# Patient Record
Sex: FEMALE | Race: WHITE | NOT HISPANIC OR LATINO | URBAN - METROPOLITAN AREA
[De-identification: names, ages, dates, MRNs, and addresses within clinical notes are randomized per-mention and may not be internally consistent; named-entity substitution may affect disease eponyms.]

---

## 2021-05-04 ENCOUNTER — PREPPED CHART (OUTPATIENT)
Dept: URBAN - METROPOLITAN AREA CLINIC 53 | Facility: CLINIC | Age: 71
End: 2021-05-04

## 2021-05-04 VITALS — HEIGHT: 55 IN | SYSTOLIC BLOOD PRESSURE: 130 MMHG | DIASTOLIC BLOOD PRESSURE: 86 MMHG | HEART RATE: 57 BPM

## 2021-05-06 ENCOUNTER — CATARACT PRE-OP (OUTPATIENT)
Dept: URBAN - METROPOLITAN AREA CLINIC 53 | Facility: CLINIC | Age: 71
End: 2021-05-06

## 2021-05-06 VITALS — HEIGHT: 55 IN | HEART RATE: 57 BPM | DIASTOLIC BLOOD PRESSURE: 86 MMHG | SYSTOLIC BLOOD PRESSURE: 130 MMHG

## 2021-05-06 DIAGNOSIS — H25.811: ICD-10-CM

## 2021-05-06 PROCEDURE — PREOP PRE OP VISIT

## 2021-05-06 ASSESSMENT — TONOMETRY
OD_IOP_MMHG: 14
OS_IOP_MMHG: 14
OD_IOP_MMHG: 14
OS_IOP_MMHG: 14

## 2021-05-06 ASSESSMENT — VISUAL ACUITY
OS_SC: 20/40
OD_SC: 20/100
OD_SC: 20/100-
OS_SC: 20/40

## 2021-05-06 NOTE — PATIENT DISCUSSION
LENS OPTION (PREMIUM): Discussed with patient in detail all available methods and lens options as well as their associated benefits, limitations and out-of-pocket costs. Patient chooses femtosecond laser-assisted cataract surgery with presbyopia-correcting lens implant. The patient understands that with any IOL there is no guarantee that they will not require glasses to see their best at any distance after surgery. The risks, benefits and alternatives to surgery were explained and all questions were answered.

## 2021-05-06 NOTE — PATIENT DISCUSSION
CATARACT SURGERY PLANNER - VIVITY IOL/+FEMTO: Phacoemulsification with IOL: Eye: OD|DOS: 05/11/2021|Model: [id number]|Power: +21. 0|Target: [place]|Femto: YES|Arcs: 45 @ 0 ; 45 @ 180|Axis: NO|Visc: DUET|Omidria: YES|10% Phenylephrine: YES|Epi-shugarcaine: YES|Phaco Setting: DENSE|BSS+: NO|Trypan Blue: NO|CTR: NO|Olive Tip: NO|Atropine: NO|Pupilloplasty: NO|Notes: H/O LASIK PLAN ORA.

## 2021-05-11 ENCOUNTER — SAME DAY PO (OUTPATIENT)
Dept: URBAN - METROPOLITAN AREA CLINIC 49 | Facility: CLINIC | Age: 71
End: 2021-05-11

## 2021-05-11 ENCOUNTER — SURGERY/PROCEDURE (OUTPATIENT)
Dept: URBAN - METROPOLITAN AREA SURGERY 16 | Facility: SURGERY | Age: 71
End: 2021-05-11

## 2021-05-11 DIAGNOSIS — H25.12: ICD-10-CM

## 2021-05-11 DIAGNOSIS — Z96.1: ICD-10-CM

## 2021-05-11 DIAGNOSIS — Z98.41: ICD-10-CM

## 2021-05-11 DIAGNOSIS — H25.811: ICD-10-CM

## 2021-05-11 PROCEDURE — DAT015FEMT VIVITY EDOF IOL WITH FEMTO

## 2021-05-11 PROCEDURE — 99024 POSTOP FOLLOW-UP VISIT: CPT

## 2021-05-11 PROCEDURE — 66984 XCAPSL CTRC RMVL W/O ECP: CPT

## 2021-05-11 ASSESSMENT — TONOMETRY: OD_IOP_MMHG: 23

## 2021-05-11 ASSESSMENT — VISUAL ACUITY: OD_SC: 20/50+2

## 2021-05-11 NOTE — PATIENT DISCUSSION
CATARACT SURGERY PLANNER - VIVITY IOL/+FEMTO: Phacoemulsification with IOL: Eye: OD|DOS: 05/11/2021|Model: [id number]|Power: +21. 0|Target: [place]|Femto: YES|Arcs: 45 @ 0 ; 45 @ 180|Axis: NO|Visc: DUET|Omidria: YES|10% Phenylephrine: YES|Epi-shugarcaine: YES|Phaco Setting: DENSE|BSS+: NO|Trypan Blue: NO|CTR: NO|Olive Tip: NO|Atropine: NO|Pupilloplasty: NO|Notes: H/O LASIK PLAN ORA.

## 2021-05-18 ENCOUNTER — CATARACT PRE-OP (OUTPATIENT)
Dept: URBAN - METROPOLITAN AREA CLINIC 53 | Facility: CLINIC | Age: 71
End: 2021-05-18

## 2021-05-18 VITALS — SYSTOLIC BLOOD PRESSURE: 103 MMHG | HEART RATE: 57 BPM | DIASTOLIC BLOOD PRESSURE: 75 MMHG | HEIGHT: 55 IN

## 2021-05-18 DIAGNOSIS — Z96.1: ICD-10-CM

## 2021-05-18 DIAGNOSIS — H25.12: ICD-10-CM

## 2021-05-18 DIAGNOSIS — H25.812: ICD-10-CM

## 2021-05-18 PROCEDURE — 92136 - 2N OPHTHALMIC BIOMETRY BY PARTIAL COHERENCE INTERFEROMETRY WITH INTRAOCULAR LENS POWER CALCULATION

## 2021-05-18 PROCEDURE — PREOP PRE OP VISIT

## 2021-05-18 ASSESSMENT — TONOMETRY
OS_IOP_MMHG: 14
OD_IOP_MMHG: 14

## 2021-05-18 ASSESSMENT — VISUAL ACUITY
OS_SC: 20/40
OD_SC: 20/30

## 2021-05-18 NOTE — PATIENT DISCUSSION
CATARACT SURGERY PLANNER - MULTIFOCAL IOL/+FEMTO: Phacoemulsification with IOL: Eye: OS|DOS: 05/25/2021|Model: [id number]|Power: +21.00|Target: [place]|Femto: YES|Arcs: 45 @ 60 ; 45 @ 240|Axis: -|Visc: DUET|Omidria: YES|10% Phenylephrine: YES|Epi-shugarcaine: YES|Phaco Setting: STANDARD|BSS+: NO|Trypan Blue: NO|CTR: NO|Olive Tip: NO|Atropine: NO|Pupilloplasty: NO|Notes: PLAN ORA.

## 2021-05-25 ENCOUNTER — SURGERY/PROCEDURE (OUTPATIENT)
Dept: URBAN - METROPOLITAN AREA SURGERY 16 | Facility: SURGERY | Age: 71
End: 2021-05-25

## 2021-05-25 ENCOUNTER — SAME DAY PO (OUTPATIENT)
Dept: URBAN - METROPOLITAN AREA CLINIC 49 | Facility: CLINIC | Age: 71
End: 2021-05-25

## 2021-05-25 DIAGNOSIS — H25.812: ICD-10-CM

## 2021-05-25 DIAGNOSIS — Z96.1: ICD-10-CM

## 2021-05-25 DIAGNOSIS — Z98.42: ICD-10-CM

## 2021-05-25 PROCEDURE — 99024 POSTOP FOLLOW-UP VISIT: CPT

## 2021-05-25 PROCEDURE — DAT015FEMT VIVITY EDOF IOL WITH FEMTO

## 2021-05-25 PROCEDURE — 66984 XCAPSL CTRC RMVL W/O ECP: CPT

## 2021-05-25 ASSESSMENT — VISUAL ACUITY: OS_SC: 20/80

## 2021-05-25 ASSESSMENT — TONOMETRY: OS_IOP_MMHG: 04

## 2021-06-04 ENCOUNTER — POST-OP (OUTPATIENT)
Dept: URBAN - METROPOLITAN AREA CLINIC 53 | Facility: CLINIC | Age: 71
End: 2021-06-04

## 2021-06-04 DIAGNOSIS — H35.372: ICD-10-CM

## 2021-06-04 DIAGNOSIS — Z96.1: ICD-10-CM

## 2021-06-04 DIAGNOSIS — H25.812: ICD-10-CM

## 2021-06-04 DIAGNOSIS — H25.12: ICD-10-CM

## 2021-06-04 PROCEDURE — 99024 POSTOP FOLLOW-UP VISIT: CPT

## 2021-06-04 PROCEDURE — 92134 CPTRZ OPH DX IMG PST SGM RTA: CPT

## 2021-06-04 ASSESSMENT — TONOMETRY
OS_IOP_MMHG: 12
OD_IOP_MMHG: 12

## 2021-06-04 ASSESSMENT — VISUAL ACUITY
OU_SC: J1+
OU_SC: 20/40
OD_SC: 20/60
OU_SC: J3 @ 22"
OS_SC: 20/40+2
OS_PH: 20/30
OD_PH: 20/25-1

## 2021-06-24 ENCOUNTER — 4 WEEK POST-OP (OUTPATIENT)
Dept: URBAN - METROPOLITAN AREA CLINIC 53 | Facility: CLINIC | Age: 71
End: 2021-06-24

## 2021-06-24 DIAGNOSIS — H25.12: ICD-10-CM

## 2021-06-24 DIAGNOSIS — H35.373: ICD-10-CM

## 2021-06-24 DIAGNOSIS — H25.812: ICD-10-CM

## 2021-06-24 DIAGNOSIS — H52.4: ICD-10-CM

## 2021-06-24 DIAGNOSIS — H26.492: ICD-10-CM

## 2021-06-24 DIAGNOSIS — Z96.1: ICD-10-CM

## 2021-06-24 PROCEDURE — 92015 DETERMINE REFRACTIVE STATE: CPT

## 2021-06-24 PROCEDURE — 99024 POSTOP FOLLOW-UP VISIT: CPT

## 2021-06-24 PROCEDURE — 92134 CPTRZ OPH DX IMG PST SGM RTA: CPT

## 2021-06-24 ASSESSMENT — VISUAL ACUITY
OS_SC: 20/40-2
OD_GLARE: 20/30
OD_PH: 20/30+2
OD_GLARE: 20/30
OU_SC: 20/30
OS_GLARE: 20/40
OU_SC: J1
OS_PH: 20/30
OD_SC: 20/40
OS_GLARE: 20/30

## 2021-06-24 ASSESSMENT — TONOMETRY
OD_IOP_MMHG: 14
OS_IOP_MMHG: 13

## 2024-07-11 ENCOUNTER — NEW PATIENT (OUTPATIENT)
Dept: URBAN - METROPOLITAN AREA CLINIC 49 | Facility: LOCATION | Age: 74
End: 2024-07-11

## 2024-07-11 DIAGNOSIS — H40.013: ICD-10-CM

## 2024-07-11 DIAGNOSIS — H35.372: ICD-10-CM

## 2024-07-11 DIAGNOSIS — H52.4: ICD-10-CM

## 2024-07-11 DIAGNOSIS — G90.2: ICD-10-CM

## 2024-07-11 PROCEDURE — 92134 CPTRZ OPH DX IMG PST SGM RTA: CPT

## 2024-07-11 PROCEDURE — 92015 DETERMINE REFRACTIVE STATE: CPT

## 2024-07-11 PROCEDURE — 99204 OFFICE O/P NEW MOD 45 MIN: CPT

## 2024-07-11 ASSESSMENT — TONOMETRY
OD_IOP_MMHG: 09
OS_IOP_MMHG: 09

## 2024-07-11 ASSESSMENT — VISUAL ACUITY
OU_CC: J1+@16"
OD_GLARE: 20/60
OS_GLARE: 20/40
OD_CC: 20/25
OU_CC: 20/25+2
OS_GLARE: 20/25
OS_SC: 20/30
OD_SC: 20/30
OD_GLARE: 20/40
OU_SC: J2@16"
OU_SC: 20/25
OS_CC: 20/40

## 2024-07-15 ENCOUNTER — DIAGNOSTICS ONLY (OUTPATIENT)
Dept: URBAN - METROPOLITAN AREA CLINIC 49 | Facility: LOCATION | Age: 74
End: 2024-07-15

## 2024-07-15 DIAGNOSIS — H40.013: ICD-10-CM

## 2024-07-15 PROCEDURE — 92083 EXTENDED VISUAL FIELD XM: CPT

## 2024-07-15 PROCEDURE — 92133 CPTRZD OPH DX IMG PST SGM ON: CPT
